# Patient Record
Sex: MALE | Race: WHITE | NOT HISPANIC OR LATINO | URBAN - METROPOLITAN AREA
[De-identification: names, ages, dates, MRNs, and addresses within clinical notes are randomized per-mention and may not be internally consistent; named-entity substitution may affect disease eponyms.]

---

## 2022-08-04 ENCOUNTER — INPATIENT (INPATIENT)
Facility: HOSPITAL | Age: 52
LOS: 0 days | Discharge: HOME | DRG: 435 | End: 2022-08-05
Attending: GENERAL ACUTE CARE HOSPITAL | Admitting: HOSPITALIST
Payer: COMMERCIAL

## 2022-08-04 VITALS
HEART RATE: 95 BPM | RESPIRATION RATE: 16 BRPM | DIASTOLIC BLOOD PRESSURE: 105 MMHG | OXYGEN SATURATION: 99 % | TEMPERATURE: 98 F | WEIGHT: 184.97 LBS | SYSTOLIC BLOOD PRESSURE: 175 MMHG

## 2022-08-04 DIAGNOSIS — C79.9 SECONDARY MALIGNANT NEOPLASM OF UNSPECIFIED SITE: ICD-10-CM

## 2022-08-04 DIAGNOSIS — C78.00 SECONDARY MALIGNANT NEOPLASM OF UNSPECIFIED LUNG: ICD-10-CM

## 2022-08-04 DIAGNOSIS — I10 ESSENTIAL (PRIMARY) HYPERTENSION: ICD-10-CM

## 2022-08-04 DIAGNOSIS — U07.1 COVID-19: ICD-10-CM

## 2022-08-04 DIAGNOSIS — C20 MALIGNANT NEOPLASM OF RECTUM: ICD-10-CM

## 2022-08-04 DIAGNOSIS — R63.8 OTHER SYMPTOMS AND SIGNS CONCERNING FOOD AND FLUID INTAKE: ICD-10-CM

## 2022-08-04 DIAGNOSIS — R19.7 DIARRHEA, UNSPECIFIED: ICD-10-CM

## 2022-08-04 DIAGNOSIS — C78.7 SECONDARY MALIGNANT NEOPLASM OF LIVER AND INTRAHEPATIC BILE DUCT: ICD-10-CM

## 2022-08-04 DIAGNOSIS — J34.89 OTHER SPECIFIED DISORDERS OF NOSE AND NASAL SINUSES: ICD-10-CM

## 2022-08-04 DIAGNOSIS — K76.9 LIVER DISEASE, UNSPECIFIED: ICD-10-CM

## 2022-08-04 LAB
ALBUMIN SERPL ELPH-MCNC: 4.1 G/DL — SIGNIFICANT CHANGE UP (ref 3.3–5)
ALP SERPL-CCNC: 60 U/L — SIGNIFICANT CHANGE UP (ref 40–120)
ALT FLD-CCNC: 26 U/L — SIGNIFICANT CHANGE UP (ref 10–45)
ANION GAP SERPL CALC-SCNC: 10 MMOL/L — SIGNIFICANT CHANGE UP (ref 5–17)
APPEARANCE UR: CLEAR — SIGNIFICANT CHANGE UP
APTT BLD: 28.2 SEC — SIGNIFICANT CHANGE UP (ref 27.5–35.5)
AST SERPL-CCNC: 24 U/L — SIGNIFICANT CHANGE UP (ref 10–40)
BACTERIA # UR AUTO: PRESENT /HPF
BASOPHILS # BLD AUTO: 0.01 K/UL — SIGNIFICANT CHANGE UP (ref 0–0.2)
BASOPHILS NFR BLD AUTO: 0.2 % — SIGNIFICANT CHANGE UP (ref 0–2)
BILIRUB SERPL-MCNC: 0.9 MG/DL — SIGNIFICANT CHANGE UP (ref 0.2–1.2)
BILIRUB UR-MCNC: NEGATIVE — SIGNIFICANT CHANGE UP
BLD GP AB SCN SERPL QL: NEGATIVE — SIGNIFICANT CHANGE UP
BUN SERPL-MCNC: 19 MG/DL — SIGNIFICANT CHANGE UP (ref 7–23)
CALCIUM SERPL-MCNC: 9.1 MG/DL — SIGNIFICANT CHANGE UP (ref 8.4–10.5)
CHLORIDE SERPL-SCNC: 100 MMOL/L — SIGNIFICANT CHANGE UP (ref 96–108)
CO2 SERPL-SCNC: 27 MMOL/L — SIGNIFICANT CHANGE UP (ref 22–31)
COLOR SPEC: YELLOW — SIGNIFICANT CHANGE UP
CREAT SERPL-MCNC: 1.03 MG/DL — SIGNIFICANT CHANGE UP (ref 0.5–1.3)
DIFF PNL FLD: NEGATIVE — SIGNIFICANT CHANGE UP
EGFR: 88 ML/MIN/1.73M2 — SIGNIFICANT CHANGE UP
EOSINOPHIL # BLD AUTO: 0.04 K/UL — SIGNIFICANT CHANGE UP (ref 0–0.5)
EOSINOPHIL NFR BLD AUTO: 0.9 % — SIGNIFICANT CHANGE UP (ref 0–6)
EPI CELLS # UR: SIGNIFICANT CHANGE UP /HPF (ref 0–5)
GLUCOSE BLDC GLUCOMTR-MCNC: 106 MG/DL — HIGH (ref 70–99)
GLUCOSE SERPL-MCNC: 98 MG/DL — SIGNIFICANT CHANGE UP (ref 70–99)
GLUCOSE UR QL: NEGATIVE — SIGNIFICANT CHANGE UP
HCT VFR BLD CALC: 36.8 % — LOW (ref 39–50)
HCT VFR BLD CALC: 38.9 % — LOW (ref 39–50)
HGB BLD-MCNC: 12.5 G/DL — LOW (ref 13–17)
HGB BLD-MCNC: 13.2 G/DL — SIGNIFICANT CHANGE UP (ref 13–17)
IMM GRANULOCYTES NFR BLD AUTO: 0 % — SIGNIFICANT CHANGE UP (ref 0–1.5)
INR BLD: 1.02 — SIGNIFICANT CHANGE UP (ref 0.88–1.16)
KETONES UR-MCNC: NEGATIVE — SIGNIFICANT CHANGE UP
LACTATE SERPL-SCNC: 1 MMOL/L — SIGNIFICANT CHANGE UP (ref 0.5–2)
LEUKOCYTE ESTERASE UR-ACNC: NEGATIVE — SIGNIFICANT CHANGE UP
LIDOCAIN IGE QN: 39 U/L — SIGNIFICANT CHANGE UP (ref 7–60)
LYMPHOCYTES # BLD AUTO: 1.09 K/UL — SIGNIFICANT CHANGE UP (ref 1–3.3)
LYMPHOCYTES # BLD AUTO: 23.2 % — SIGNIFICANT CHANGE UP (ref 13–44)
MAGNESIUM SERPL-MCNC: 1.9 MG/DL — SIGNIFICANT CHANGE UP (ref 1.6–2.6)
MCHC RBC-ENTMCNC: 28.7 PG — SIGNIFICANT CHANGE UP (ref 27–34)
MCHC RBC-ENTMCNC: 29.1 PG — SIGNIFICANT CHANGE UP (ref 27–34)
MCHC RBC-ENTMCNC: 33.9 GM/DL — SIGNIFICANT CHANGE UP (ref 32–36)
MCHC RBC-ENTMCNC: 34 GM/DL — SIGNIFICANT CHANGE UP (ref 32–36)
MCV RBC AUTO: 84.6 FL — SIGNIFICANT CHANGE UP (ref 80–100)
MCV RBC AUTO: 85.6 FL — SIGNIFICANT CHANGE UP (ref 80–100)
MONOCYTES # BLD AUTO: 0.43 K/UL — SIGNIFICANT CHANGE UP (ref 0–0.9)
MONOCYTES NFR BLD AUTO: 9.2 % — SIGNIFICANT CHANGE UP (ref 2–14)
NEUTROPHILS # BLD AUTO: 3.12 K/UL — SIGNIFICANT CHANGE UP (ref 1.8–7.4)
NEUTROPHILS NFR BLD AUTO: 66.5 % — SIGNIFICANT CHANGE UP (ref 43–77)
NITRITE UR-MCNC: NEGATIVE — SIGNIFICANT CHANGE UP
NRBC # BLD: 0 /100 WBCS — SIGNIFICANT CHANGE UP (ref 0–0)
NRBC # BLD: 0 /100 WBCS — SIGNIFICANT CHANGE UP (ref 0–0)
PH UR: 6 — SIGNIFICANT CHANGE UP (ref 5–8)
PLATELET # BLD AUTO: 227 K/UL — SIGNIFICANT CHANGE UP (ref 150–400)
PLATELET # BLD AUTO: 243 K/UL — SIGNIFICANT CHANGE UP (ref 150–400)
POTASSIUM SERPL-MCNC: 3.8 MMOL/L — SIGNIFICANT CHANGE UP (ref 3.5–5.3)
POTASSIUM SERPL-SCNC: 3.8 MMOL/L — SIGNIFICANT CHANGE UP (ref 3.5–5.3)
PROT SERPL-MCNC: 6.7 G/DL — SIGNIFICANT CHANGE UP (ref 6–8.3)
PROT UR-MCNC: ABNORMAL MG/DL
PROTHROM AB SERPL-ACNC: 12.1 SEC — SIGNIFICANT CHANGE UP (ref 10.5–13.4)
RBC # BLD: 4.3 M/UL — SIGNIFICANT CHANGE UP (ref 4.2–5.8)
RBC # BLD: 4.6 M/UL — SIGNIFICANT CHANGE UP (ref 4.2–5.8)
RBC # FLD: 12.5 % — SIGNIFICANT CHANGE UP (ref 10.3–14.5)
RBC # FLD: 12.6 % — SIGNIFICANT CHANGE UP (ref 10.3–14.5)
RBC CASTS # UR COMP ASSIST: < 5 /HPF — SIGNIFICANT CHANGE UP
RH IG SCN BLD-IMP: POSITIVE — SIGNIFICANT CHANGE UP
SARS-COV-2 RNA SPEC QL NAA+PROBE: POSITIVE
SODIUM SERPL-SCNC: 137 MMOL/L — SIGNIFICANT CHANGE UP (ref 135–145)
SP GR SPEC: >=1.03 — SIGNIFICANT CHANGE UP (ref 1–1.03)
UROBILINOGEN FLD QL: 0.2 E.U./DL — SIGNIFICANT CHANGE UP
WBC # BLD: 4.69 K/UL — SIGNIFICANT CHANGE UP (ref 3.8–10.5)
WBC # BLD: 5.1 K/UL — SIGNIFICANT CHANGE UP (ref 3.8–10.5)
WBC # FLD AUTO: 4.69 K/UL — SIGNIFICANT CHANGE UP (ref 3.8–10.5)
WBC # FLD AUTO: 5.1 K/UL — SIGNIFICANT CHANGE UP (ref 3.8–10.5)
WBC UR QL: < 5 /HPF — SIGNIFICANT CHANGE UP

## 2022-08-04 PROCEDURE — 88307 TISSUE EXAM BY PATHOLOGIST: CPT | Mod: 26

## 2022-08-04 PROCEDURE — 74177 CT ABD & PELVIS W/CONTRAST: CPT | Mod: 26,MA

## 2022-08-04 PROCEDURE — 76942 ECHO GUIDE FOR BIOPSY: CPT | Mod: 26

## 2022-08-04 PROCEDURE — 88173 CYTOPATH EVAL FNA REPORT: CPT | Mod: 26

## 2022-08-04 PROCEDURE — 99285 EMERGENCY DEPT VISIT HI MDM: CPT | Mod: 25

## 2022-08-04 PROCEDURE — 88305 TISSUE EXAM BY PATHOLOGIST: CPT | Mod: 26

## 2022-08-04 PROCEDURE — 88313 SPECIAL STAINS GROUP 2: CPT | Mod: 26

## 2022-08-04 PROCEDURE — 99447 NTRPROF PH1/NTRNET/EHR 11-20: CPT

## 2022-08-04 PROCEDURE — 88342 IMHCHEM/IMCYTCHM 1ST ANTB: CPT | Mod: 26,59

## 2022-08-04 PROCEDURE — 88342 IMHCHEM/IMCYTCHM 1ST ANTB: CPT | Mod: 26

## 2022-08-04 PROCEDURE — 71045 X-RAY EXAM CHEST 1 VIEW: CPT | Mod: 26

## 2022-08-04 PROCEDURE — 88341 IMHCHEM/IMCYTCHM EA ADD ANTB: CPT | Mod: 26

## 2022-08-04 PROCEDURE — 47000 NEEDLE BIOPSY OF LIVER PERQ: CPT

## 2022-08-04 PROCEDURE — 99223 1ST HOSP IP/OBS HIGH 75: CPT | Mod: GC

## 2022-08-04 PROCEDURE — 88341 IMHCHEM/IMCYTCHM EA ADD ANTB: CPT | Mod: 26,59

## 2022-08-04 PROCEDURE — 99053 MED SERV 10PM-8AM 24 HR FAC: CPT

## 2022-08-04 RX ORDER — DIATRIZOATE MEGLUMINE 180 MG/ML
30 INJECTION, SOLUTION INTRAVESICAL ONCE
Refills: 0 | Status: COMPLETED | OUTPATIENT
Start: 2022-08-04 | End: 2022-08-04

## 2022-08-04 RX ADMIN — DIATRIZOATE MEGLUMINE 30 MILLILITER(S): 180 INJECTION, SOLUTION INTRAVESICAL at 02:27

## 2022-08-04 NOTE — H&P ADULT - PROBLEM SELECTOR PLAN 1
GI (Rectal) primary CA w/ mets to Liver/ Lungs on admission CT A/P.  F/up w/ NP in [facility] and had an extensive w/up done in the past 1 month included CT abdomen which demonstrates rectal thickening, and a hepatic lesion concerning for metastasis, Colonoscopy that noted hemorrhoids, but did not identify any clear mass. EGD wnl. GI PCR negative. Admitted for possible diagnostic Liver bx w/ IR  -possible diagnostic Liver bx w/ IR  -NPO pending procedure   -H/O consulted, f/up recs GI (Rectal) primary CA w/ mets to Liver/ Lungs on admission CT A/P.  F/up w/ NP in [facility] and had an extensive w/up done in the past 1 month included CT abdomen which demonstrates rectal thickening, and a hepatic lesion concerning for metastasis, Colonoscopy that noted hemorrhoids, but did not identify any clear mass. EGD wnl. GI PCR negative. Admitted for possible diagnostic Liver bx w/ IR  -diagnostic Liver bx w/ IR  -NPO pending procedure   -H/O consulted, f/up recs

## 2022-08-04 NOTE — H&P ADULT - ATTENDING COMMENTS
Patient was seen and examined with the resident team today.  I agree with Dr. Rajat Pierre's assessment and plan with the following exceptions/additions:     Briefly, this is a 52yo gentleman, never smoker, with no PMH who p/w weight loss and diarrhea in the setting of an outpatient work-up concerning for quickly progressing metastatic GI malignancy w/LAD and hepatic mets.  Incidentally found to have COVID w/mild rhinorrhea.  Planning for liver biopsy with Dr. Weems today.  Patient of Dr. Turcios.      #Metastatic GI malignancy - biopsy today; please discuss with Dr. Paulino (covering for Dr. Turcios) what labs (i.e tumor markers) and imaging is required for this admission.  I spoke to Dr. Paulino and the aim is to start treatment as quickly as possible.      Zo Greer  [phone] Patient was seen and examined with the resident team today.  I agree with Dr. Rajat Pierre's assessment and plan with the following exceptions/additions:     Briefly, this is a 52yo gentleman, never smoker, with no PMH who p/w weight loss and diarrhea in the setting of an outpatient work-up concerning for quickly progressing metastatic GI malignancy w/LAD and hepatic mets.  Incidentally found to have COVID w/mild rhinorrhea.  Planning for liver biopsy with Dr. Weems today.  Patient of Dr. Turcios.      #Metastatic GI malignancy - biopsy today; please discuss with Dr. Paulino (covering for Dr. Turcios) what labs (i.e tumor markers) and imaging is required for this admission.  I spoke to Dr. Paulino and the aim is to start treatment as quickly as possible.    #COVID Infection - only symptom is rhinorrhea, on room air; discussed MAB but apprehensive; this is day 2 of symptoms so will re-assess interest after biopsy    Zo Greer  [phone]

## 2022-08-04 NOTE — CONSULT NOTE ADULT - ASSESSMENT
Assessment: 51M COVID + w/ no previously known PMH being admitted for diagnostic biopsy of Liver w/ IR in the setting of recent outpt w/up concerning for rapidly progressing possible GI primary CA w/ mets to Liver/ Lungs on admission CT A/P. Pt admitted for urgent workup. IR consulted for liver mass biopsy. Case reviewed with Dr. Weems, plan for biopsy with sedation and ultrasound guidance.     Recommendations: NPO at midnight prior to procedure with sedation. D/C blood thinners.    Communicated with: primary team

## 2022-08-04 NOTE — ED PROVIDER NOTE - OBJECTIVE STATEMENT
51M no PMH c/o abdominal bloating and discomfort. pt states ongoing for the past 2 months. states has had 50lb unintentional weight loss.  no vomiting, no fevers. states has been having diarrhea.  pt states was seen at outside facility and had multiple labs and studies. had CT concerning for liver lesion and malignancy.  pt here for admission to have biopsy done by Dr. Weems in the morning.

## 2022-08-04 NOTE — PATIENT PROFILE ADULT - FUNCTIONAL ASSESSMENT - BASIC MOBILITY 6.
4-calculated by average /Not able to assess (calculate score using [facility] averaging method)

## 2022-08-04 NOTE — PATIENT PROFILE ADULT - FALL HARM RISK - UNIVERSAL INTERVENTIONS
Bed in lowest position, wheels locked, appropriate side rails in place/Call bell, personal items and telephone in reach/Instruct patient to call for assistance before getting out of bed or chair/Non-slip footwear when patient is out of bed/[place] to call system/Physically safe environment - no spills, clutter or unnecessary equipment/Purposeful Proactive Rounding/Room/bathroom lighting operational, light cord in reach

## 2022-08-04 NOTE — CONSULT NOTE ADULT - SUBJECTIVE AND OBJECTIVE BOX
BRENTON MELENDEZ  MRN-[id number]      HPI:    Brenton Melendez is a previously well 51M who has had 50 lbs weight loss over past 3-4 months associated with diarrhea, abd bloating and urgency.  He has had work-up in [facility] that included a colonoscopy that noted hemorrhoids, but did not identify any clear mass.  He has also had a CT abdomen which demonstrates rectal thickening, and a hepatic lesion concerning for metastasis.    He had been pending diagnostic biopsy, which was delayed due to COVID positive test.  Given patient's rapidly progressing disease, and value of a timely biopsy to determine treatment approach, he has been admitted to expedite.          PAST MEDICAL & SURGICAL HISTORY:  No pertinent past medical history    No significant past surgical history        MEDICATIONS  (STANDING):      Allergies    No Known Allergies    Intolerances          FAMILY HISTORY:      ROS:  ROS is otherwise negative.    Physical exam:    Vital signs  Vital Signs Last 24 Hrs  T(C): 36.8 (08-04-22 @ 06:40), Max: 37 (08-04-22 @ 03:04)  T(F): 98.2 (08-04-22 @ 06:40), Max: 98.6 (08-04-22 @ 03:04)  HR: 90 (08-04-22 @ 06:40) (83 - 95)  BP: 152/93 (08-04-22 @ 07:24) (141/87 - 175/105)  BP(mean): --  RR: 18 (08-04-22 @ 07:24) (16 - 18)  SpO2: 98% (08-04-22 @ 07:24) (98% - 100%)  HEENT: PERRLA, pink mucosae  Respiratory: No resp distress  Abdomen: soft, non tender  Extremities:  no peripheral edema  Neuro: alert, awake and oriented x 3, no focal abnormalities  Skin: warm, no obvious lesions on visible skin    LABS:  CBC Full  -  ( 04 Aug 2022 01:41 )  WBC Count : 4.69 K/uL  Hemoglobin : 12.5 g/dL  Hematocrit : 36.8 %  Platelet Count - Automated : 243 K/uL  Mean Cell Volume : 85.6 fl  Mean Cell Hemoglobin : 29.1 pg  Mean Cell Hemoglobin Concentration : 34.0 gm/dL  Auto Neutrophil # : 3.12 K/uL  Auto Lymphocyte # : 1.09 K/uL  Auto Monocyte # : 0.43 K/uL  Auto Eosinophil # : 0.04 K/uL  Auto Basophil # : 0.01 K/uL  Auto Neutrophil % : 66.5 %  Auto Lymphocyte % : 23.2 %  Auto Monocyte % : 9.2 %  Auto Eosinophil % : 0.9 %  Auto Basophil % : 0.2 %    04 Aug 2022 01:41    137    |  100    |  19     ----------------------------<  98     3.8     |  27     |  1.03     Ca    9.1        04 Aug 2022 01:41  Mg     1.9       04 Aug 2022 01:41    TPro  6.7    /  Alb  4.1    /  TBili  0.9    /  DBili  x      /  AST  24     /  ALT  26     /  AlkPhos  60     04 Aug 2022 01:41      PERTINENT IMAGING STUDIES: reviewed  < from: CT Abdomen and Pelvis w/ Oral Cont and w/ IV Cont (08.04.22 @ 03:17) >  IMPRESSION:  1. Irregular mural thickening in the rectum, highly suspicious for a   primary  rectal malignancy.  2. Multiple hepatic metastases, the largest measuring up to 7.5 cm.  3. Bulky retroperitoneal lymphadenopathy and perirectal lymphadenopathy,   most  compatible with sally metastases.  4. Multiple solid pulmonary nodules, suspected pulmonary metastases.  5. Mild ascites.  6. Additional incidental/nonemergent findings are discussed in the body   of the    < end of copied text >    ASSESSMENT:  51M with liver lesion, RP lymphadenopathy, rectal thickening and weight loss     PLAN  Malignancy Work-up  Presentation concerning for malignancy, possible GI primary  To identify, IR guided biopsy is indicated  Further investigations could include MRI pelvis, with rectal protocol and PET scan but are not as urgent as obtaining a diagnostic biopsy    Thank you    Jamin Paulino MD for Traci Turcios MD  [phone]
51M COVID + w/ no previously known PMH being admitted for diagnostic biopsy of Liver w/ IR in the setting of recent outpt w/up concerning for rapidly progressing possible GI primary CA w/ mets to Liver/ Lungs on admission CT A/P. Pt states he was in his usual state of health until 2-3 month ago when he started having abd pain/ bloating, diarrhea 6-8 episodes/day alternating w/ constipation, urgency, and weight loss of ~ 50 lbs (10lbs in the last week). He f/up w/ NP in [place] and had an extensive w/up done in the past 1 month included CT abdomen which demonstrates rectal thickening, and a hepatic lesion concerning for metastasis, colonoscopy that noted hemorrhoids, but did not identify any clear mass, EGD wnl. Pt admitted for urgent workup. IR consulted for liver mass biopsy.     Clinical History: LIVER LESION    Handoff    MEWS Score    No pertinent past medical history    Liver lesion    Cancer, metastatic    2019 novel coronavirus disease (COVID-19)    Essential hypertension    Nutrition, metabolism, and development symptoms    Diarrhea    No significant past surgical history    ABDOMINAL PAIN    [email or web address]        Meds:    Allergies:No Known Allergies        Labs:                           12.5   4.69  )-----------( 243      ( 04 Aug 2022 01:41 )             36.8     PT/INR - ( 04 Aug 2022 01:41 )   PT: 12.1 sec;   INR: 1.02          PTT - ( 04 Aug 2022 01:41 )  PTT:28.2 sec  08-04    137  |  100  |  19  ----------------------------<  98  3.8   |  27  |  1.03    Ca    9.1      04 Aug 2022 01:41  Mg     1.9     08-04    TPro  6.7  /  Alb  4.1  /  TBili  0.9  /  DBili  x   /  AST  24  /  ALT  26  /  AlkPhos  60  08-04          Imaging Findings: reviewed, Multiple hepatic metastases, the largest measuring up to 7.5 cm.

## 2022-08-04 NOTE — H&P ADULT - ASSESSMENT
51M COVID + w/ no previously known PMH being admitted for diagnostic biopsy of Liver w/ IR in the setting of recent outpt w/up concerning for rapidly progressing possible GI primary CA w/ mets to Liver/ Lungs on admission CT A/P. Pending possible IR biopsy of liver  51M COVID + w/ no previously known PMH being admitted for diagnostic biopsy of Liver w/ IR in the setting of recent outpt w/up concerning for rapidly progressing possible GI primary CA w/ mets to Liver/ Lungs on admission CT A/P.   51M COVID + w/ no previously known PMH being admitted for diagnostic biopsy of Liver w/ IR in the setting of recent dx of rapidly progressing GI (Rectal) primary CA w/ mets to Liver/ Lungs on admission CT A/P.

## 2022-08-04 NOTE — H&P ADULT - PROBLEM SELECTOR PLAN 5
F: NPO pending procedure   E: replete K<4, Mg<2  N: NPO pending procedure     VTE Prophylaxis: SCD pending procedure   C: Full Code  D: CHARMAINE

## 2022-08-04 NOTE — ED ADULT TRIAGE NOTE - INTERNATIONAL TRAVEL
pt c/o insomnia (5 days no sleep), increased anxiety with chest pain. pt noted to have dystonic movements in triage. [employer] to triage, pt for ,. No

## 2022-08-04 NOTE — H&P ADULT - HISTORY OF PRESENT ILLNESS
51M COVID + w/ no previously known PMH being admitted for diagnostic biopsy of Liver w/ IR in the setting of recent outpt w/up concerning for rapidly progressing possible GI primary CA w/ mets to Liver/ Lungs on admission CT A/P. Pt states he was in his usual state of health till 2-3 month ago when he started having abd pain/ bloating, diarrhea 6-8 episodes/day alternating w/ constipation, urgency, and weight loss of ~ 50 lbs.  He f/up w/ NP in [facility] and had an extensive w/up done in the past 1 month included CT abdomen which demonstrates rectal thickening, and a hepatic lesion concerning for metastasis, Colonoscopy that noted hemorrhoids, but did not identify any clear mass., EGD wnl. Pt  states he does not have outpt H/O set up yet and was referred  to [facility] and Dr. Weems by a friend that works here for liver biopsy to expedite Dx to determine treatment course. Pt was found to be COVID + on admission. Reports runny nose X 1-2 days and dry cough. COVID vaccinated X3 (Moderna). Denies f/c, CP, SOB, n/v, dysuria, recent sick contacts and travel hx. Denies any toxic habits and is sexually active w/ woman only (one partner his wife). ROS otherwise negative.     ED course:   VS: 98, 95->78, 175/105->152/93  Labs: Hgb 12.5   Imaging: CT A/P:   Rx: None  51M COVID + w/ no previously known PMH being admitted for diagnostic biopsy of Liver w/ IR in the setting of recent dx of rapidly progressing GI (Rectal) primary CA w/ mets to Liver/ Lungs on admission CT A/P. Pt states he was in his usual state of health till 2-3 month ago when he started having abd pain/ bloating, diarrhea 6-8 episodes/day alternating w/ constipation, urgency, and weight loss of ~ 50 lbs.  He f/up w/ NP in [facility] and had an extensive w/up done in the past 1 month including CT A/P which demonstrated rectal thickening, and a hepatic lesion concerning for metastasis, Colonoscopy that noted hemorrhoids, but did not identify any clear mass, EGD that was unremarkable. Pt  states he does not have outpt H/O set up yet and was referred  to [facility] Dr. Turcios  and Dr. Weems by a friend that works here for liver biopsy to expedite Dx and determine treatment course. Pt was found to be COVID + on admission. Reports runny nose X 1-2 days and dry cough. COVID vaccinated X3 (Moderna). Denies f/c, CP, SOB, n/v, dysuria, recent sick contacts and travel hx. Denies any toxic habits, family hx of CRC or other malignancies, occupational exposures, sexually active w/ woman only (one partner his wife). ROS otherwise negative.     ED course:   VS: 98, 95->78, 175/105->152/93  Labs: Hgb 12.5   Imaging: CT A/P: Irregular mural thickening in the rectum, highly suspicious for a primary rectal malignancy.Multiple hepatic metastases, the largest measuring up to 7.5 cm.Bulky retroperitoneal lymphadenopathy and perirectal lymphadenopathy, most compatible with sally metastases. Multiple solid pulmonary nodules, suspected pulmonary metastases. Mild ascites.    Rx: None

## 2022-08-04 NOTE — ED PROVIDER NOTE - CLINICAL SUMMARY MEDICAL DECISION MAKING FREE TEXT BOX
abd discomfort, bloating, weight loss, concern for malignancy on outside scans  -check labs  -ekg  -cxr  -CT a/p

## 2022-08-04 NOTE — ED ADULT NURSE NOTE - OBJECTIVE STATEMENT
patient states he has had 50lbs of unintentional weight loss recently associated with abdominal pain. pt with decrease appetite, nausea/diarrhea. denies CP/SOB. RR easy, even, un-labored on room air

## 2022-08-04 NOTE — H&P ADULT - NSHPPHYSICALEXAM_GEN_ALL_CORE
VITAL SIGNS:  T(C): 36.8 (08-04-22 @ 06:40), Max: 37 (08-04-22 @ 03:04)  T(F): 98.2 (08-04-22 @ 06:40), Max: 98.6 (08-04-22 @ 03:04)  HR: 78 (08-04-22 @ 07:24) (78 - 95)  BP: 152/93 (08-04-22 @ 07:24) (141/87 - 175/105)  BP(mean): --  RR: 18 (08-04-22 @ 07:24) (16 - 18)  SpO2: 98% (08-04-22 @ 07:24) (98% - 100%)  Wt(kg): --    PHYSICAL EXAM:  Constitutional: NAD, Anxious   ENT:  MMM  Respiratory: CTA B/L; no W/R/R, no retractions  Cardiac: +S1/S2; RRR; no M/R/G   Gastrointestinal: abdomen soft, NT/ND; no rebound or guarding; +BSx4  Back: spine midline, no bony tenderness or step-offs; no CVAT B/L  Extremities: WWP, no clubbing or cyanosis; no peripheral edema  Vascular: 2+ radial, femoral, DP/PT pulses B/L  Neurologic: AAOx3

## 2022-08-04 NOTE — H&P ADULT - PROBLEM SELECTOR PLAN 3
Pt reports abd pain/bloating, NBNB diarrhea alternating w/ constipation X 2-3 months. S/p Colonoscopy that noted hemorrhoids, but did not identify any clear mass. EGD wnl. GI PCR negative in the OSH a month ago.  -BMP w/out lyte derangements  -encourage hydration   - f/u GI PCR Pt reports abd pain/bloating, NBNB diarrhea alternating w/ constipation X 2-3 months. S/p Colonoscopy that noted hemorrhoids, but did not identify any clear mass. EGD wnl. GI PCR negative in the OSH a month ago.  -BMP w/out lyte derangements  -encourage hydration   -consider GI PCR if pt has diarrhea

## 2022-08-04 NOTE — H&P ADULT - PROBLEM SELECTOR PLAN 4
Bp 175/105->152/93. Pt denies known Dx of HTN. Not on Rx at home. Elevated BP could be 2/2 new onset HTN vs anxiety   -c/t monitor   -consider starting Lisinopril 10 if SBP> 150s post procedure and uptitrate PRN Bp 175/105->152/93. Pt denies known Dx of HTN. Not on Rx at home. Elevated BP could be 2/2 new onset HTN vs anxiety   -c/t monitor as pt scheduled for Liver biopsy   -consider starting Lisinopril 10 if SBP> 150s post procedure and uptitrate PRN

## 2022-08-04 NOTE — H&P ADULT - PROBLEM SELECTOR PLAN 2
Pt was found to be COVID + on admission 8/4. Reports runny nose X 1-2 days and dry cough. COVID vaccinated X3 (Moderna). O2Sat 98% RA rest and w/ ambulation   -Pt does not meet criteria for Rx at this time   -c/w Symptomatic Rx at this time   -discussed Mab while inpt given immunosuppressed state 2/2 malignancy, pt said he wants to read about it first Pt was found to be COVID + on admission 8/4. Reports runny nose X 1-2 days and dry cough. COVID vaccinated X3 (Moderna). O2Sat 98% RA rest and w/ ambulation   -does not meet criteria for Rx at this time   -c/w Symptomatic Rx   -discussed Mab given immunosuppressed state 2/2 malignancy, pt wants to read about it first, readdress in am

## 2022-08-05 VITALS
SYSTOLIC BLOOD PRESSURE: 148 MMHG | HEART RATE: 70 BPM | DIASTOLIC BLOOD PRESSURE: 89 MMHG | RESPIRATION RATE: 17 BRPM | TEMPERATURE: 99 F | OXYGEN SATURATION: 98 %

## 2022-08-05 LAB
ALBUMIN SERPL ELPH-MCNC: 3.7 G/DL — SIGNIFICANT CHANGE UP (ref 3.3–5)
ALP SERPL-CCNC: 60 U/L — SIGNIFICANT CHANGE UP (ref 40–120)
ALT FLD-CCNC: 27 U/L — SIGNIFICANT CHANGE UP (ref 10–45)
ANION GAP SERPL CALC-SCNC: 10 MMOL/L — SIGNIFICANT CHANGE UP (ref 5–17)
AST SERPL-CCNC: 28 U/L — SIGNIFICANT CHANGE UP (ref 10–40)
BASOPHILS # BLD AUTO: 0.02 K/UL — SIGNIFICANT CHANGE UP (ref 0–0.2)
BASOPHILS NFR BLD AUTO: 0.3 % — SIGNIFICANT CHANGE UP (ref 0–2)
BILIRUB SERPL-MCNC: 1.2 MG/DL — SIGNIFICANT CHANGE UP (ref 0.2–1.2)
BLD GP AB SCN SERPL QL: NEGATIVE — SIGNIFICANT CHANGE UP
BUN SERPL-MCNC: 13 MG/DL — SIGNIFICANT CHANGE UP (ref 7–23)
CALCIUM SERPL-MCNC: 9.2 MG/DL — SIGNIFICANT CHANGE UP (ref 8.4–10.5)
CHLORIDE SERPL-SCNC: 99 MMOL/L — SIGNIFICANT CHANGE UP (ref 96–108)
CO2 SERPL-SCNC: 26 MMOL/L — SIGNIFICANT CHANGE UP (ref 22–31)
CREAT SERPL-MCNC: 0.87 MG/DL — SIGNIFICANT CHANGE UP (ref 0.5–1.3)
EGFR: 105 ML/MIN/1.73M2 — SIGNIFICANT CHANGE UP
EOSINOPHIL # BLD AUTO: 0.06 K/UL — SIGNIFICANT CHANGE UP (ref 0–0.5)
EOSINOPHIL NFR BLD AUTO: 1 % — SIGNIFICANT CHANGE UP (ref 0–6)
GLUCOSE SERPL-MCNC: 105 MG/DL — HIGH (ref 70–99)
HCT VFR BLD CALC: 39.6 % — SIGNIFICANT CHANGE UP (ref 39–50)
HGB BLD-MCNC: 13.5 G/DL — SIGNIFICANT CHANGE UP (ref 13–17)
IMM GRANULOCYTES NFR BLD AUTO: 0.6 % — SIGNIFICANT CHANGE UP (ref 0–1.5)
LYMPHOCYTES # BLD AUTO: 1.1 K/UL — SIGNIFICANT CHANGE UP (ref 1–3.3)
LYMPHOCYTES # BLD AUTO: 17.7 % — SIGNIFICANT CHANGE UP (ref 13–44)
MAGNESIUM SERPL-MCNC: 1.9 MG/DL — SIGNIFICANT CHANGE UP (ref 1.6–2.6)
MCHC RBC-ENTMCNC: 28.6 PG — SIGNIFICANT CHANGE UP (ref 27–34)
MCHC RBC-ENTMCNC: 34.1 GM/DL — SIGNIFICANT CHANGE UP (ref 32–36)
MCV RBC AUTO: 83.9 FL — SIGNIFICANT CHANGE UP (ref 80–100)
MONOCYTES # BLD AUTO: 0.58 K/UL — SIGNIFICANT CHANGE UP (ref 0–0.9)
MONOCYTES NFR BLD AUTO: 9.4 % — SIGNIFICANT CHANGE UP (ref 2–14)
NEUTROPHILS # BLD AUTO: 4.4 K/UL — SIGNIFICANT CHANGE UP (ref 1.8–7.4)
NEUTROPHILS NFR BLD AUTO: 71 % — SIGNIFICANT CHANGE UP (ref 43–77)
NRBC # BLD: 0 /100 WBCS — SIGNIFICANT CHANGE UP (ref 0–0)
PHOSPHATE SERPL-MCNC: 3.8 MG/DL — SIGNIFICANT CHANGE UP (ref 2.5–4.5)
PLATELET # BLD AUTO: 235 K/UL — SIGNIFICANT CHANGE UP (ref 150–400)
POTASSIUM SERPL-MCNC: 4 MMOL/L — SIGNIFICANT CHANGE UP (ref 3.5–5.3)
POTASSIUM SERPL-SCNC: 4 MMOL/L — SIGNIFICANT CHANGE UP (ref 3.5–5.3)
PROT SERPL-MCNC: 6.7 G/DL — SIGNIFICANT CHANGE UP (ref 6–8.3)
RBC # BLD: 4.72 M/UL — SIGNIFICANT CHANGE UP (ref 4.2–5.8)
RBC # FLD: 12.4 % — SIGNIFICANT CHANGE UP (ref 10.3–14.5)
RH IG SCN BLD-IMP: POSITIVE — SIGNIFICANT CHANGE UP
SODIUM SERPL-SCNC: 135 MMOL/L — SIGNIFICANT CHANGE UP (ref 135–145)
WBC # BLD: 6.2 K/UL — SIGNIFICANT CHANGE UP (ref 3.8–10.5)
WBC # FLD AUTO: 6.2 K/UL — SIGNIFICANT CHANGE UP (ref 3.8–10.5)

## 2022-08-05 PROCEDURE — 88305 TISSUE EXAM BY PATHOLOGIST: CPT

## 2022-08-05 PROCEDURE — 83605 ASSAY OF LACTIC ACID: CPT

## 2022-08-05 PROCEDURE — 76942 ECHO GUIDE FOR BIOPSY: CPT

## 2022-08-05 PROCEDURE — 36415 COLL VENOUS BLD VENIPUNCTURE: CPT

## 2022-08-05 PROCEDURE — 74177 CT ABD & PELVIS W/CONTRAST: CPT | Mod: MA

## 2022-08-05 PROCEDURE — 83690 ASSAY OF LIPASE: CPT

## 2022-08-05 PROCEDURE — 99285 EMERGENCY DEPT VISIT HI MDM: CPT

## 2022-08-05 PROCEDURE — 84100 ASSAY OF PHOSPHORUS: CPT

## 2022-08-05 PROCEDURE — 47000 NEEDLE BIOPSY OF LIVER PERQ: CPT

## 2022-08-05 PROCEDURE — 88173 CYTOPATH EVAL FNA REPORT: CPT

## 2022-08-05 PROCEDURE — 88342 IMHCHEM/IMCYTCHM 1ST ANTB: CPT

## 2022-08-05 PROCEDURE — 85610 PROTHROMBIN TIME: CPT

## 2022-08-05 PROCEDURE — 88341 IMHCHEM/IMCYTCHM EA ADD ANTB: CPT

## 2022-08-05 PROCEDURE — 85025 COMPLETE CBC W/AUTO DIFF WBC: CPT

## 2022-08-05 PROCEDURE — 81001 URINALYSIS AUTO W/SCOPE: CPT

## 2022-08-05 PROCEDURE — 82962 GLUCOSE BLOOD TEST: CPT

## 2022-08-05 PROCEDURE — 86900 BLOOD TYPING SEROLOGIC ABO: CPT

## 2022-08-05 PROCEDURE — 85730 THROMBOPLASTIN TIME PARTIAL: CPT

## 2022-08-05 PROCEDURE — 99239 HOSP IP/OBS DSCHRG MGMT >30: CPT

## 2022-08-05 PROCEDURE — 88307 TISSUE EXAM BY PATHOLOGIST: CPT

## 2022-08-05 PROCEDURE — 85027 COMPLETE CBC AUTOMATED: CPT

## 2022-08-05 PROCEDURE — 86850 RBC ANTIBODY SCREEN: CPT

## 2022-08-05 PROCEDURE — 88313 SPECIAL STAINS GROUP 2: CPT

## 2022-08-05 PROCEDURE — 83735 ASSAY OF MAGNESIUM: CPT

## 2022-08-05 PROCEDURE — 71045 X-RAY EXAM CHEST 1 VIEW: CPT

## 2022-08-05 PROCEDURE — 87635 SARS-COV-2 COVID-19 AMP PRB: CPT

## 2022-08-05 PROCEDURE — 80053 COMPREHEN METABOLIC PANEL: CPT

## 2022-08-05 PROCEDURE — 86901 BLOOD TYPING SEROLOGIC RH(D): CPT

## 2022-08-05 RX ORDER — BEBTELOVIMAB 87.5 MG/ML
175 INJECTION, SOLUTION INTRAVENOUS ONCE
Refills: 0 | Status: COMPLETED | OUTPATIENT
Start: 2022-08-05 | End: 2022-08-05

## 2022-08-05 RX ORDER — LISINOPRIL 2.5 MG/1
10 TABLET ORAL ONCE
Refills: 0 | Status: COMPLETED | OUTPATIENT
Start: 2022-08-05 | End: 2022-08-05

## 2022-08-05 RX ADMIN — BEBTELOVIMAB 175 MILLIGRAM(S): 87.5 INJECTION, SOLUTION INTRAVENOUS at 13:24

## 2022-08-05 RX ADMIN — LISINOPRIL 10 MILLIGRAM(S): 2.5 TABLET ORAL at 06:48

## 2022-08-05 NOTE — DISCHARGE NOTE NURSING/CASE MANAGEMENT/SOCIAL WORK - NSDCPEFALRISK_GEN_ALL_CORE
For information on Fall & Injury Prevention, visit: [email or web address] OR  [email or web address] OR  https://www.cdc.gov/steadi/patient.html

## 2022-08-05 NOTE — DISCHARGE NOTE PROVIDER - CARE PROVIDER_API CALL
Jamin Paulino)  Internal Medicine  [address] [place] [address], [address]  [place] [address]  Phone: [phone]  Fax: [phone]  Follow Up Time: 1 week

## 2022-08-05 NOTE — DISCHARGE NOTE PROVIDER - NSDCCPCAREPLAN_GEN_ALL_CORE_FT
PRINCIPAL DISCHARGE DIAGNOSIS  Diagnosis: Liver lesion  Assessment and Plan of Treatment: You presented for a biopsy of your liver for further workup. This biopsy was completed and you will follow up with Dr. Paulino outpatient for results and further management.      SECONDARY DISCHARGE DIAGNOSES  Diagnosis: 2019 novel coronavirus disease (COVID-19)  Assessment and Plan of Treatment: You were diagnosed with the new COVID-19 coronavirus infection via a nasal swab. The treatment for this infection is supportive care, which includes: rest, maintaining adequate oral intake of food and water, and taking acetaminophen/tylenol for fever. Please maintain a strict home quarantine for 10 days at home, and wear a surgical mask at all times when you need to be in close proximity with another human being. Take tylenol as needed, every 6 hours, with a maximum daily dose of 4,000 mg a day. Please visit your nearest urgent care or emergency department should you start to experience: severe shortness of breath, severe cough/wheezing/difficulty breathing, or fever >103 for 3 days. Please maintain a good healthy diet. If you have any questions, please call your primary care provider.

## 2022-08-05 NOTE — DISCHARGE NOTE NURSING/CASE MANAGEMENT/SOCIAL WORK - PATIENT PORTAL LINK FT
You can access the FollowMyHealth Patient Portal offered by [facility] by registering at the following website: [email or web address]. By joining [employer]’s FollowMyHealth portal, you will also be able to view your health information using other applications (apps) compatible with our system.

## 2022-08-05 NOTE — DISCHARGE NOTE PROVIDER - HOSPITAL COURSE
#Discharge: do not delete    Patient is __ yo M/F with past medical history of _____ presented with _____, found to have _____ (one liner)    Hospital course (by problem):     Patient was discharged to: (home/[facility]/acute rehab/hospice, etc, and with what services – home health PT/RN? Home O2?)    New medications:   Changes to old medications:  Medications that were stopped:    Items to follow up as outpatient:    Physical exam at the time of discharge:       #Discharge: do not delete    Patient is a 51M COVID + w/ no previously known PMH being admitted for diagnostic biopsy of Liver w/ IR in the setting of recent dx of rapidly progressing GI (Rectal) primary CA w/ mets to Liver/ Lungs on admission CT A/P. Patient underwent uncomplicated hepatic bx with IR on 8/4. Found to be incidentally COVID +. Only symptom was rhinorrhea. No cough, SOB or O2 requirement. Patient given monoclonal antibodies 8/5.    Hospital course (by problem):    Problem/Plan - 1:  ·  Problem: Cancer, metastatic.   ·  Plan: GI (Rectal) primary CA w/ mets to Liver/ Lungs on admission CT A/P.  F/up w/ NP in [facility] and had an extensive w/up done in the past 1 month included CT abdomen which demonstrates rectal thickening, and a hepatic lesion concerning for metastasis, Colonoscopy that noted hemorrhoids, but did not identify any clear mass. EGD wnl. GI PCR negative. Admitted for possible diagnostic Liver bx w/ IR  -diagnostic Liver bx w/ IR  -NPO pending procedure   -H/O consulted, f/up recs.     Problem/Plan - 2:  ·  Problem: 2019 novel coronavirus disease (COVID-19).   ·  Plan: Pt was found to be COVID + on admission 8/4. Reports runny nose X 1-2 days and dry cough. COVID vaccinated X3 (Moderna). O2Sat 98% RA rest and w/ ambulation   -does not meet criteria for Rx at this time   -c/w Symptomatic Rx   -discussed Mab given immunosuppressed state 2/2 malignancy, pt wants to read about it first, readdress in am.  Patient was discharged to: Home    New medications: None  Changes to old medications: None  Medications that were stopped: None    Items to follow up as outpatient:  Biopsy results    Physical exam at the time of discharge:  Constitutional: NAD, Anxious   ENT:  MMM  Respiratory: CTA B/L; no W/R/R, no retractions  Cardiac: +S1/S2; RRR; no M/R/G   Gastrointestinal: abdomen soft, NT/ND; no rebound or guarding; +BSx4  Back: spine midline, no bony tenderness or step-offs; no CVAT B/L  Extremities: WWP, no clubbing or cyanosis; no peripheral edema  Vascular: 2+ radial, femoral, DP/PT pulses B/L  Neurologic: AAOx3     #Discharge: do not delete    Patient is a 51M COVID + w/ no previously known PMH being admitted for diagnostic biopsy of Liver w/ IR in the setting of recent dx of rapidly progressing GI (Rectal) primary CA w/ mets to Liver/ Lungs on admission CT A/P. Patient underwent uncomplicated hepatic bx with IR on 8/4. Found to be incidentally COVID +. Only symptom was rhinorrhea. No cough, SOB or O2 requirement. Patient given monoclonal antibodies 8/5.    Hospital course (by problem):     #Cancer, metastatic: GI (Rectal) primary CA w/ mets to Liver/ Lungs on admission CT A/P.  F/up w/ NP in [facility] and had an extensive w/up done in the past 1 month included CT abdomen which demonstrates rectal thickening, and a hepatic lesion concerning for metastasis, Colonoscopy that noted hemorrhoids, but did not identify any clear mass. EGD wnl. GI PCR negative. S/p Liver bx w/ IR. Pending PET and MRI outpatient. F/u Dr. Paulino to f/u biopsy results and imaging.     #2019 novel coronavirus disease (COVID-19): Pt was found to be COVID + on admission 8/4. Reports runny nose X 1-2 days and dry cough. COVID vaccinated X3 (Moderna). O2Sat 98% RA rest and w/ ambulation. S/p MAB    Patient was discharged to: Home    New medications: None  Changes to old medications: None  Medications that were stopped: None    Items to follow up as outpatient:  Biopsy results    Physical exam at the time of discharge:  Constitutional: NAD, Anxious   ENT:  MMM  Respiratory: CTA B/L; no W/R/R, no retractions  Cardiac: +S1/S2; RRR; no M/R/G   Gastrointestinal: abdomen soft, NT/ND; no rebound or guarding; +BSx4  Back: spine midline, no bony tenderness or step-offs; no CVAT B/L  Extremities: WWP, no clubbing or cyanosis; no peripheral edema  Vascular: 2+ radial, femoral, DP/PT pulses B/L  Neurologic: AAOx3       Patient is a 51M COVID + w/ no previously known PMH being admitted for diagnostic biopsy of Liver w/ IR in the setting of recent dx of rapidly progressing GI (Rectal) primary CA w/ mets to Liver/ Lungs on admission CT A/P. Patient underwent uncomplicated hepatic bx with IR on 8/4. Found to be incidentally COVID +. Only symptom was rhinorrhea. No cough, SOB or O2 requirement. Patient given monoclonal antibodies 8/5.    Hospital course (by problem):     #Cancer, metastatic: GI (Rectal) primary CA w/ mets to Liver/ Lungs on admission CT A/P.  F/up w/ NP in [facility] and had an extensive w/up done in the past 1 month included CT abdomen which demonstrates rectal thickening, and a hepatic lesion concerning for metastasis, Colonoscopy that noted hemorrhoids, but did not identify any clear mass. EGD wnl. GI PCR negative. S/p Liver bx w/ IR. Pending PET and MRI outpatient. F/u Dr. Paulino to f/u biopsy results and imaging.     #2019 novel coronavirus disease (COVID-19): Pt was found to be COVID + on admission 8/4. Reports runny nose X 1-2 days and dry cough. COVID vaccinated X3 (Moderna). O2Sat 98% RA rest and w/ ambulation. S/p MAB    Patient was discharged to: Home    New medications: None  Changes to old medications: None  Medications that were stopped: None    Items to follow up as outpatient:  Biopsy results    Physical exam at the time of discharge:  Constitutional: NAD, Anxious   ENT:  MMM  Respiratory: CTA B/L; no W/R/R, no retractions  Cardiac: +S1/S2; RRR; no M/R/G   Gastrointestinal: abdomen soft, NT/ND; no rebound or guarding; +BSx4  Back: spine midline, no bony tenderness or step-offs; no CVAT B/L  Extremities: WWP, no clubbing or cyanosis; no peripheral edema  Vascular: 2+ radial, femoral, DP/PT pulses B/L  Neurologic: AAOx3

## 2022-08-08 LAB — NON-GYNECOLOGICAL CYTOLOGY STUDY: SIGNIFICANT CHANGE UP
